# Patient Record
Sex: FEMALE | Race: BLACK OR AFRICAN AMERICAN | URBAN - METROPOLITAN AREA
[De-identification: names, ages, dates, MRNs, and addresses within clinical notes are randomized per-mention and may not be internally consistent; named-entity substitution may affect disease eponyms.]

---

## 2021-05-05 ENCOUNTER — APPOINTMENT (RX ONLY)
Dept: URBAN - METROPOLITAN AREA CLINIC 203 | Facility: CLINIC | Age: 51
Setting detail: DERMATOLOGY
End: 2021-05-05

## 2021-05-05 DIAGNOSIS — L28.0 LICHEN SIMPLEX CHRONICUS: ICD-10-CM | Status: INADEQUATELY CONTROLLED

## 2021-05-05 PROCEDURE — ? PRESCRIPTION

## 2021-05-05 PROCEDURE — ? COUNSELING

## 2021-05-05 PROCEDURE — ? INTRALESIONAL KENALOG

## 2021-05-05 PROCEDURE — ? ADDITIONAL NOTES

## 2021-05-05 PROCEDURE — 11901 INJECT SKIN LESIONS >7: CPT

## 2021-05-05 PROCEDURE — ? PRESCRIPTION MEDICATION MANAGEMENT

## 2021-05-05 RX ORDER — TACROLIMUS 1 MG/G
0.1% OINTMENT TOPICAL BID
Qty: 1 | Refills: 2 | Status: ERX | COMMUNITY
Start: 2021-05-05

## 2021-05-05 RX ADMIN — TACROLIMUS 0.1%: 1 OINTMENT TOPICAL at 00:00

## 2021-05-05 ASSESSMENT — LOCATION DETAILED DESCRIPTION DERM
LOCATION DETAILED: RIGHT DISTAL PRETIBIAL REGION
LOCATION DETAILED: RIGHT LATERAL PROXIMAL PRETIBIAL REGION
LOCATION DETAILED: RIGHT PROXIMAL PRETIBIAL REGION

## 2021-05-05 ASSESSMENT — LOCATION SIMPLE DESCRIPTION DERM: LOCATION SIMPLE: RIGHT PRETIBIAL REGION

## 2021-05-05 ASSESSMENT — LOCATION ZONE DERM: LOCATION ZONE: LEG

## 2021-05-05 NOTE — PROCEDURE: ADDITIONAL NOTES
Additional Notes: Patient consent was obtained to proceed with the visit and recommended plan of care after discussion of all risks and benefits, including the risks of COVID-19 exposure.\\n
Detail Level: Simple
Render Risk Assessment In Note?: no
Additional Notes: Dermajet to farhana ankles (8 each) yanick 4 mg/cc

## 2021-05-05 NOTE — PROCEDURE: INTRALESIONAL KENALOG
Total Volume Injected (Ccs- Only Use Numbers And Decimals): 1
Consent: The risks of atrophy were reviewed with the patient.
X Size Of Lesion In Cm (Optional): 0
Validate Note Data When Using Inventory: Yes
Medical Necessity Clause: This procedure was medically necessary because the lesions that were treated were:
Administered By (Optional): [facility]
Detail Level: Detailed
Include Z78.9 (Other Specified Conditions Influencing Health Status) As An Associated Diagnosis?: No
Concentration Of Solution Injected (Mg/Ml): 4.0
Kenalog Preparation: Kenalog

## 2021-05-05 NOTE — PROCEDURE: PRESCRIPTION MEDICATION MANAGEMENT
none
Detail Level: Zone
Render In Strict Bullet Format?: No
Initiate Treatment: Tacrolimus ointment bid

## 2021-06-02 ENCOUNTER — APPOINTMENT (RX ONLY)
Dept: URBAN - METROPOLITAN AREA CLINIC 203 | Facility: CLINIC | Age: 51
Setting detail: DERMATOLOGY
End: 2021-06-02

## 2021-06-02 DIAGNOSIS — L28.0 LICHEN SIMPLEX CHRONICUS: ICD-10-CM

## 2021-06-02 PROCEDURE — ? ADDITIONAL NOTES

## 2021-06-02 PROCEDURE — ? COUNSELING

## 2021-06-02 PROCEDURE — ? PRESCRIPTION MEDICATION MANAGEMENT

## 2021-06-02 PROCEDURE — 11901 INJECT SKIN LESIONS >7: CPT

## 2021-06-02 PROCEDURE — ? INTRALESIONAL KENALOG

## 2021-06-02 ASSESSMENT — LOCATION DETAILED DESCRIPTION DERM
LOCATION DETAILED: LEFT LATERAL HEEL
LOCATION DETAILED: RIGHT LATERAL DORSAL FOOT
LOCATION DETAILED: RIGHT LATERAL HEEL
LOCATION DETAILED: LEFT LATERAL DORSAL FOOT
LOCATION DETAILED: LEFT LATERAL ACHILLES SKIN

## 2021-06-02 ASSESSMENT — LOCATION SIMPLE DESCRIPTION DERM
LOCATION SIMPLE: RIGHT FOOT
LOCATION SIMPLE: LEFT FOOT

## 2021-06-02 ASSESSMENT — LOCATION ZONE DERM: LOCATION ZONE: FEET

## 2021-06-02 NOTE — PROCEDURE: ADDITIONAL NOTES
Render Risk Assessment In Note?: no
Detail Level: Simple
Additional Notes: Dermajet to farhana heels (8 each) yanick 4 mg/cc
Additional Notes: Patient consent was obtained to proceed with the visit and recommended plan of care after discussion of all risks and benefits, including the risks of COVID-19 exposure.\\n

## 2021-08-04 ENCOUNTER — APPOINTMENT (RX ONLY)
Dept: URBAN - METROPOLITAN AREA CLINIC 203 | Facility: CLINIC | Age: 51
Setting detail: DERMATOLOGY
End: 2021-08-04

## 2021-08-04 DIAGNOSIS — L28.0 LICHEN SIMPLEX CHRONICUS: ICD-10-CM

## 2021-08-04 PROCEDURE — ? TREATMENT REGIMEN

## 2021-08-04 PROCEDURE — ? INTRALESIONAL KENALOG

## 2021-08-04 PROCEDURE — ? COUNSELING

## 2021-08-04 PROCEDURE — ? ADDITIONAL NOTES

## 2021-08-04 PROCEDURE — 11901 INJECT SKIN LESIONS >7: CPT

## 2021-08-04 PROCEDURE — ? PRESCRIPTION MEDICATION MANAGEMENT

## 2021-08-04 ASSESSMENT — LOCATION ZONE DERM
LOCATION ZONE: FEET
LOCATION ZONE: LEG

## 2021-08-04 ASSESSMENT — LOCATION DETAILED DESCRIPTION DERM
LOCATION DETAILED: LEFT ACHILLES SKIN
LOCATION DETAILED: RIGHT HEEL
LOCATION DETAILED: LEFT HEEL
LOCATION DETAILED: RIGHT ACHILLES SKIN

## 2021-08-04 ASSESSMENT — LOCATION SIMPLE DESCRIPTION DERM
LOCATION SIMPLE: RIGHT ACHILLES SKIN
LOCATION SIMPLE: LEFT HEEL
LOCATION SIMPLE: LEFT ACHILLES SKIN
LOCATION SIMPLE: RIGHT HEEL

## 2021-08-04 NOTE — PROCEDURE: ADDITIONAL NOTES
Render Risk Assessment In Note?: no
Detail Level: Simple
Additional Notes: Dermajet to farhana heels (8 each) yanick 10 mg/cc
Additional Notes: Patient consent was obtained to proceed with the visit and recommended plan of care after discussion of all risks and benefits, including the risks of COVID-19 exposure.\\n

## 2021-08-04 NOTE — PROCEDURE: PRESCRIPTION MEDICATION MANAGEMENT
Detail Level: Zone
Plan: Increase ILK to 10mg/ml today
Render In Strict Bullet Format?: No
Continue Regimen: Tacrolimus Ointment BID

## 2021-08-04 NOTE — PROCEDURE: INTRALESIONAL KENALOG
Total Volume Injected (Ccs- Only Use Numbers And Decimals): 1
Consent: The risks of atrophy were reviewed with the patient.
X Size Of Lesion In Cm (Optional): 0
Validate Note Data When Using Inventory: Yes
Medical Necessity Clause: This procedure was medically necessary because the lesions that were treated were:
Administered By (Optional): ALTA Rayo / Sue
Detail Level: Detailed
Include Z78.9 (Other Specified Conditions Influencing Health Status) As An Associated Diagnosis?: No
Concentration Of Solution Injected (Mg/Ml): 10.0
Kenalog Preparation: Kenalog

## 2021-08-18 ENCOUNTER — APPOINTMENT (RX ONLY)
Dept: URBAN - METROPOLITAN AREA CLINIC 203 | Facility: CLINIC | Age: 51
Setting detail: DERMATOLOGY
End: 2021-08-18

## 2021-08-18 DIAGNOSIS — L28.0 LICHEN SIMPLEX CHRONICUS: ICD-10-CM

## 2021-08-18 PROCEDURE — ? COUNSELING

## 2021-08-18 PROCEDURE — 11901 INJECT SKIN LESIONS >7: CPT

## 2021-08-18 PROCEDURE — ? PRESCRIPTION MEDICATION MANAGEMENT

## 2021-08-18 PROCEDURE — ? INTRALESIONAL KENALOG

## 2021-08-18 PROCEDURE — ? ADDITIONAL NOTES

## 2021-08-18 PROCEDURE — ? TREATMENT REGIMEN

## 2021-08-18 ASSESSMENT — LOCATION DETAILED DESCRIPTION DERM
LOCATION DETAILED: LEFT ACHILLES SKIN
LOCATION DETAILED: RIGHT ACHILLES SKIN
LOCATION DETAILED: RIGHT HEEL
LOCATION DETAILED: LEFT HEEL

## 2021-08-18 ASSESSMENT — LOCATION SIMPLE DESCRIPTION DERM
LOCATION SIMPLE: RIGHT HEEL
LOCATION SIMPLE: RIGHT ACHILLES SKIN
LOCATION SIMPLE: LEFT HEEL
LOCATION SIMPLE: LEFT ACHILLES SKIN

## 2021-08-18 ASSESSMENT — LOCATION ZONE DERM
LOCATION ZONE: LEG
LOCATION ZONE: FEET

## 2021-08-18 NOTE — PROCEDURE: PRESCRIPTION MEDICATION MANAGEMENT
Detail Level: Zone
Plan: Repeat DJ/ILK to 10mg/ml today (patient notes improvement from last visit)
Render In Strict Bullet Format?: No
Continue Regimen: Tacrolimus Ointment BID

## 2021-08-18 NOTE — PROCEDURE: INTRALESIONAL KENALOG
Total Volume Injected (Ccs- Only Use Numbers And Decimals): 1
Consent: The risks of atrophy were reviewed with the patient.
X Size Of Lesion In Cm (Optional): 0
Validate Note Data When Using Inventory: Yes
Medical Necessity Clause: This procedure was medically necessary because the lesions that were treated were:
Administered By (Optional): ALTA Mendes / Sue
Detail Level: Detailed
Include Z78.9 (Other Specified Conditions Influencing Health Status) As An Associated Diagnosis?: No
Concentration Of Solution Injected (Mg/Ml): 10.0
Kenalog Preparation: Kenalog

## 2021-09-01 ENCOUNTER — APPOINTMENT (RX ONLY)
Dept: URBAN - METROPOLITAN AREA CLINIC 203 | Facility: CLINIC | Age: 51
Setting detail: DERMATOLOGY
End: 2021-09-01

## 2021-09-01 DIAGNOSIS — L28.0 LICHEN SIMPLEX CHRONICUS: ICD-10-CM | Status: IMPROVED

## 2021-09-01 PROCEDURE — 11901 INJECT SKIN LESIONS >7: CPT

## 2021-09-01 PROCEDURE — ? TREATMENT REGIMEN

## 2021-09-01 PROCEDURE — ? PRESCRIPTION MEDICATION MANAGEMENT

## 2021-09-01 PROCEDURE — ? INTRALESIONAL KENALOG

## 2021-09-01 PROCEDURE — ? ADDITIONAL NOTES

## 2021-09-01 PROCEDURE — ? COUNSELING

## 2021-09-01 ASSESSMENT — LOCATION SIMPLE DESCRIPTION DERM
LOCATION SIMPLE: LEFT ACHILLES SKIN
LOCATION SIMPLE: RIGHT HEEL
LOCATION SIMPLE: LEFT HEEL
LOCATION SIMPLE: RIGHT ACHILLES SKIN

## 2021-09-01 ASSESSMENT — LOCATION DETAILED DESCRIPTION DERM
LOCATION DETAILED: RIGHT ACHILLES SKIN
LOCATION DETAILED: RIGHT HEEL
LOCATION DETAILED: LEFT HEEL
LOCATION DETAILED: LEFT ACHILLES SKIN

## 2021-09-01 ASSESSMENT — LOCATION ZONE DERM
LOCATION ZONE: FEET
LOCATION ZONE: LEG

## 2021-09-01 NOTE — PROCEDURE: ADDITIONAL NOTES
PRE-OP DIAGNOSIS:  Acute appendicitis 18-Aug-2020 17:12:11  Jewel Bass
Additional Notes: Patient consent was obtained to proceed with the visit and recommended plan of care after discussion of all risks and benefits, including the risks of COVID-19 exposure.\\n
Detail Level: Simple
Render Risk Assessment In Note?: no

## 2021-09-15 ENCOUNTER — APPOINTMENT (RX ONLY)
Dept: URBAN - METROPOLITAN AREA CLINIC 203 | Facility: CLINIC | Age: 51
Setting detail: DERMATOLOGY
End: 2021-09-15

## 2021-09-15 DIAGNOSIS — L28.0 LICHEN SIMPLEX CHRONICUS: ICD-10-CM

## 2021-09-15 PROCEDURE — ? ADDITIONAL NOTES

## 2021-09-15 PROCEDURE — ? COUNSELING

## 2021-09-15 PROCEDURE — ? INTRALESIONAL KENALOG

## 2021-09-15 PROCEDURE — ? PRESCRIPTION MEDICATION MANAGEMENT

## 2021-09-15 PROCEDURE — 11901 INJECT SKIN LESIONS >7: CPT

## 2021-09-15 ASSESSMENT — LOCATION SIMPLE DESCRIPTION DERM
LOCATION SIMPLE: RIGHT ANKLE
LOCATION SIMPLE: LEFT HEEL
LOCATION SIMPLE: LEFT ACHILLES SKIN
LOCATION SIMPLE: RIGHT ACHILLES SKIN
LOCATION SIMPLE: RIGHT HEEL

## 2021-09-15 ASSESSMENT — LOCATION DETAILED DESCRIPTION DERM
LOCATION DETAILED: LEFT ACHILLES SKIN
LOCATION DETAILED: RIGHT ACHILLES SKIN
LOCATION DETAILED: LEFT HEEL
LOCATION DETAILED: RIGHT HEEL
LOCATION DETAILED: RIGHT LATERAL POSTERIOR ANKLE

## 2021-09-15 ASSESSMENT — LOCATION ZONE DERM
LOCATION ZONE: LEG
LOCATION ZONE: FEET

## 2021-10-13 ENCOUNTER — APPOINTMENT (RX ONLY)
Dept: URBAN - METROPOLITAN AREA CLINIC 203 | Facility: CLINIC | Age: 51
Setting detail: DERMATOLOGY
End: 2021-10-13

## 2021-10-13 DIAGNOSIS — L28.0 LICHEN SIMPLEX CHRONICUS: ICD-10-CM

## 2021-10-13 PROCEDURE — ? PRESCRIPTION MEDICATION MANAGEMENT

## 2021-10-13 PROCEDURE — ? COUNSELING

## 2021-10-13 PROCEDURE — 11901 INJECT SKIN LESIONS >7: CPT

## 2021-10-13 PROCEDURE — ? INTRALESIONAL KENALOG

## 2021-10-13 PROCEDURE — ? ADDITIONAL NOTES

## 2021-10-13 PROCEDURE — ? PRESCRIPTION

## 2021-10-13 RX ORDER — HYDROCORTISONE 25 MG/G
CREAM TOPICAL
Qty: 30 | Refills: 3 | Status: ERX | COMMUNITY
Start: 2021-10-13

## 2021-10-13 RX ADMIN — HYDROCORTISONE: 25 CREAM TOPICAL at 00:00

## 2021-10-13 ASSESSMENT — LOCATION DETAILED DESCRIPTION DERM
LOCATION DETAILED: LEFT HEEL
LOCATION DETAILED: LEFT ACHILLES SKIN
LOCATION DETAILED: RIGHT LATERAL POSTERIOR ANKLE
LOCATION DETAILED: RIGHT HEEL
LOCATION DETAILED: RIGHT ACHILLES SKIN

## 2021-10-13 ASSESSMENT — LOCATION SIMPLE DESCRIPTION DERM
LOCATION SIMPLE: RIGHT ANKLE
LOCATION SIMPLE: RIGHT ACHILLES SKIN
LOCATION SIMPLE: LEFT HEEL
LOCATION SIMPLE: RIGHT HEEL
LOCATION SIMPLE: LEFT ACHILLES SKIN

## 2021-10-13 ASSESSMENT — LOCATION ZONE DERM
LOCATION ZONE: LEG
LOCATION ZONE: FEET

## 2021-11-17 ENCOUNTER — APPOINTMENT (RX ONLY)
Dept: URBAN - METROPOLITAN AREA CLINIC 203 | Facility: CLINIC | Age: 51
Setting detail: DERMATOLOGY
End: 2021-11-17

## 2021-11-17 DIAGNOSIS — L28.0 LICHEN SIMPLEX CHRONICUS: ICD-10-CM

## 2021-11-17 PROCEDURE — 11901 INJECT SKIN LESIONS >7: CPT

## 2021-11-17 PROCEDURE — ? PRESCRIPTION MEDICATION MANAGEMENT

## 2021-11-17 PROCEDURE — ? PRESCRIPTION

## 2021-11-17 PROCEDURE — ? INTRALESIONAL KENALOG

## 2021-11-17 PROCEDURE — ? COUNSELING

## 2021-11-17 PROCEDURE — ? ADDITIONAL NOTES

## 2021-11-17 RX ORDER — HYDROCORTISONE 25 MG/G
OINTMENT TOPICAL
Qty: 454 | Refills: 0 | Status: ERX | COMMUNITY
Start: 2021-11-17

## 2021-11-17 RX ADMIN — HYDROCORTISONE: 25 OINTMENT TOPICAL at 00:00

## 2021-11-17 ASSESSMENT — LOCATION DETAILED DESCRIPTION DERM
LOCATION DETAILED: LEFT ACHILLES SKIN
LOCATION DETAILED: RIGHT LATERAL POSTERIOR ANKLE
LOCATION DETAILED: LEFT HEEL
LOCATION DETAILED: RIGHT HEEL
LOCATION DETAILED: RIGHT ACHILLES SKIN

## 2021-11-17 ASSESSMENT — LOCATION SIMPLE DESCRIPTION DERM
LOCATION SIMPLE: RIGHT HEEL
LOCATION SIMPLE: RIGHT ANKLE
LOCATION SIMPLE: LEFT HEEL
LOCATION SIMPLE: RIGHT ACHILLES SKIN
LOCATION SIMPLE: LEFT ACHILLES SKIN

## 2021-11-17 ASSESSMENT — LOCATION ZONE DERM
LOCATION ZONE: LEG
LOCATION ZONE: FEET

## 2021-11-17 NOTE — PROCEDURE: PRESCRIPTION MEDICATION MANAGEMENT
Detail Level: Zone
Plan: Repeat DJ/ILK to 10mg/ml today (patient notes improvement from last visit)
Render In Strict Bullet Format?: No
Continue Regimen: Hydrocortisone 2.5% and Tacrolimus Ointment BID

## 2022-06-29 ENCOUNTER — APPOINTMENT (RX ONLY)
Dept: URBAN - METROPOLITAN AREA CLINIC 203 | Facility: CLINIC | Age: 52
Setting detail: DERMATOLOGY
End: 2022-06-29

## 2022-06-29 DIAGNOSIS — L28.0 LICHEN SIMPLEX CHRONICUS: ICD-10-CM

## 2022-06-29 PROCEDURE — ? COUNSELING

## 2022-06-29 PROCEDURE — ? PRESCRIPTION

## 2022-06-29 PROCEDURE — 11901 INJECT SKIN LESIONS >7: CPT

## 2022-06-29 PROCEDURE — ? INTRALESIONAL KENALOG

## 2022-06-29 PROCEDURE — ? PRESCRIPTION MEDICATION MANAGEMENT

## 2022-06-29 RX ORDER — CRISABOROLE 20 MG/G
2% OINTMENT TOPICAL BID
Qty: 100 | Refills: 2 | Status: ERX | COMMUNITY
Start: 2022-06-29

## 2022-06-29 RX ADMIN — CRISABOROLE 2%: 20 OINTMENT TOPICAL at 00:00

## 2022-06-29 ASSESSMENT — LOCATION DETAILED DESCRIPTION DERM
LOCATION DETAILED: LEFT ACHILLES SKIN
LOCATION DETAILED: LEFT HEEL
LOCATION DETAILED: RIGHT ACHILLES SKIN
LOCATION DETAILED: RIGHT PROXIMAL LATERAL CALF
LOCATION DETAILED: RIGHT PROXIMAL CALF
LOCATION DETAILED: RIGHT HEEL

## 2022-06-29 ASSESSMENT — LOCATION SIMPLE DESCRIPTION DERM
LOCATION SIMPLE: LEFT HEEL
LOCATION SIMPLE: RIGHT ACHILLES SKIN
LOCATION SIMPLE: LEFT ACHILLES SKIN
LOCATION SIMPLE: RIGHT HEEL
LOCATION SIMPLE: RIGHT CALF

## 2022-06-29 ASSESSMENT — LOCATION ZONE DERM
LOCATION ZONE: LEG
LOCATION ZONE: FEET

## 2022-06-29 NOTE — PROCEDURE: PRESCRIPTION MEDICATION MANAGEMENT
Detail Level: Zone
Plan: Repeat DJ/ILK to 10mg/ml at follow up
Render In Strict Bullet Format?: No
Initiate Treatment: Eucrisa 2% BID
Discontinue Regimen: Hydrocortisone 2.5% and Tacrolimus Ointment BID

## 2022-06-29 NOTE — PROCEDURE: INTRALESIONAL KENALOG
Include Z78.9 (Other Specified Conditions Influencing Health Status) As An Associated Diagnosis?: No
X Size Of Lesion In Cm (Optional): 0
Medical Necessity Clause: This procedure was medically necessary because the lesions that were treated were:
Detail Level: Detailed
Validate Note Data When Using Inventory: Yes
Consent: The risks of atrophy were reviewed with the patient.
Kenalog Preparation: Kenalog
Administered By (Optional): Bertram Grijalva
Concentration Of Solution Injected (Mg/Ml): 10.0
Total Volume Injected (Ccs- Only Use Numbers And Decimals): 1.5

## 2022-07-29 ENCOUNTER — APPOINTMENT (RX ONLY)
Dept: URBAN - METROPOLITAN AREA CLINIC 203 | Facility: CLINIC | Age: 52
Setting detail: DERMATOLOGY
End: 2022-07-29

## 2022-07-29 DIAGNOSIS — L29.8 OTHER PRURITUS: ICD-10-CM

## 2022-07-29 DIAGNOSIS — L20.89 OTHER ATOPIC DERMATITIS: ICD-10-CM | Status: INADEQUATELY CONTROLLED

## 2022-07-29 DIAGNOSIS — L29.89 OTHER PRURITUS: ICD-10-CM

## 2022-07-29 PROCEDURE — 99214 OFFICE O/P EST MOD 30 MIN: CPT | Mod: 25

## 2022-07-29 PROCEDURE — ? MEDICATION COUNSELING

## 2022-07-29 PROCEDURE — 96372 THER/PROPH/DIAG INJ SC/IM: CPT

## 2022-07-29 PROCEDURE — ? PRESCRIPTION MEDICATION MANAGEMENT

## 2022-07-29 PROCEDURE — ? COUNSELING

## 2022-07-29 PROCEDURE — ? PRESCRIPTION

## 2022-07-29 PROCEDURE — ? INTRAMUSCULAR KENALOG

## 2022-07-29 RX ORDER — RUXOLITINIB 15 MG/G
1.5% CREAM TOPICAL BID
Qty: 60 | Refills: 3 | Status: ERX | COMMUNITY
Start: 2022-07-29

## 2022-07-29 RX ADMIN — RUXOLITINIB 1.5%: 15 CREAM TOPICAL at 00:00

## 2022-07-29 ASSESSMENT — LOCATION SIMPLE DESCRIPTION DERM
LOCATION SIMPLE: RIGHT BUTTOCK
LOCATION SIMPLE: RIGHT PRETIBIAL REGION
LOCATION SIMPLE: RIGHT THIGH
LOCATION SIMPLE: RIGHT BUTTOCK
LOCATION SIMPLE: RIGHT HAND

## 2022-07-29 ASSESSMENT — SEVERITY ASSESSMENT 2020: SEVERITY 2020: MODERATE

## 2022-07-29 ASSESSMENT — BSA ECZEMA: % BODY COVERED IN ECZEMA: 15

## 2022-07-29 ASSESSMENT — LOCATION ZONE DERM
LOCATION ZONE: TRUNK
LOCATION ZONE: LEG
LOCATION ZONE: HAND
LOCATION ZONE: TRUNK

## 2022-07-29 ASSESSMENT — LOCATION DETAILED DESCRIPTION DERM
LOCATION DETAILED: RIGHT BUTTOCK
LOCATION DETAILED: RIGHT ULNAR PALM
LOCATION DETAILED: RIGHT PROXIMAL PRETIBIAL REGION
LOCATION DETAILED: RIGHT ANTERIOR DISTAL THIGH
LOCATION DETAILED: RIGHT BUTTOCK

## 2022-07-29 ASSESSMENT — ITCH NUMERIC RATING SCALE: HOW SEVERE IS YOUR ITCHING?: 7

## 2022-07-29 NOTE — PROCEDURE: PRESCRIPTION MEDICATION MANAGEMENT
Initiate Treatment: Opzelura 1.5 % topical cream BID\\nQuantity: 60.0 g  Days Supply: 30\\nSig: Apply to affected areas BID X 2 weeks to hands
Detail Level: Zone
Render In Strict Bullet Format?: No

## 2022-07-29 NOTE — PROCEDURE: MEDICATION COUNSELING
Negative Gabapentin Counseling: I discussed with the patient the risks of gabapentin including but not limited to dizziness, somnolence, fatigue and ataxia.

## 2022-07-29 NOTE — PROCEDURE: MEDICATION COUNSELING
Never smoker Cibinqo Pregnancy And Lactation Text: It is unknown if this medication will adversely affect pregnancy or breast feeding.  You should not take this medication if you are currently pregnant or planning a pregnancy or while breastfeeding.

## 2022-08-03 ENCOUNTER — APPOINTMENT (RX ONLY)
Dept: URBAN - METROPOLITAN AREA CLINIC 203 | Facility: CLINIC | Age: 52
Setting detail: DERMATOLOGY
End: 2022-08-03

## 2022-08-03 DIAGNOSIS — L20.89 OTHER ATOPIC DERMATITIS: ICD-10-CM

## 2022-08-03 PROCEDURE — ? PRESCRIPTION

## 2022-08-03 PROCEDURE — ? COUNSELING

## 2022-08-03 PROCEDURE — 99214 OFFICE O/P EST MOD 30 MIN: CPT

## 2022-08-03 PROCEDURE — ? PRESCRIPTION MEDICATION MANAGEMENT

## 2022-08-03 RX ORDER — PREDNISONE 10 MG/1
TABLET ORAL
Qty: 40 | Refills: 0 | Status: ERX | COMMUNITY
Start: 2022-08-03

## 2022-08-03 RX ADMIN — PREDNISONE: 10 TABLET ORAL at 00:00

## 2022-08-03 ASSESSMENT — LOCATION DETAILED DESCRIPTION DERM
LOCATION DETAILED: RIGHT ANTERIOR DISTAL THIGH
LOCATION DETAILED: RIGHT ULNAR PALM
LOCATION DETAILED: RIGHT PROXIMAL PRETIBIAL REGION

## 2022-08-03 ASSESSMENT — LOCATION SIMPLE DESCRIPTION DERM
LOCATION SIMPLE: RIGHT PRETIBIAL REGION
LOCATION SIMPLE: RIGHT HAND
LOCATION SIMPLE: RIGHT THIGH

## 2022-08-03 ASSESSMENT — LOCATION ZONE DERM
LOCATION ZONE: LEG
LOCATION ZONE: HAND

## 2022-08-03 NOTE — PROCEDURE: PRESCRIPTION MEDICATION MANAGEMENT
Plan: Continue using Eucerin topical lotion\\nContinue using Claritin and Benadryl for flares\\nRecommended taking Zyrtec right before bedtime
Render In Strict Bullet Format?: No
Detail Level: Zone
Initiate Treatment: prednisone 10 mg tablet \\nQuantity: 40.0 Tablet  Days Supply: 16\\nSig: Take 4 pills a day for 4 days, 3 pills a day for 4 days, 2 pills a day for 4 days, then 1 pill a day for 4 days.

## 2022-08-09 ENCOUNTER — RX ONLY (OUTPATIENT)
Age: 52
Setting detail: RX ONLY
End: 2022-08-09

## 2022-08-09 RX ORDER — PIMECROLIMUS 10 MG/G
CREAM TOPICAL
Qty: 60 | Refills: 0 | Status: ERX | COMMUNITY
Start: 2022-08-09

## 2022-08-24 ENCOUNTER — APPOINTMENT (RX ONLY)
Dept: URBAN - METROPOLITAN AREA CLINIC 203 | Facility: CLINIC | Age: 52
Setting detail: DERMATOLOGY
End: 2022-08-24

## 2022-08-24 DIAGNOSIS — L20.89 OTHER ATOPIC DERMATITIS: ICD-10-CM

## 2022-08-24 PROBLEM — L30.9 DERMATITIS, UNSPECIFIED: Status: ACTIVE | Noted: 2022-08-24

## 2022-08-24 PROCEDURE — ? BIOPSY BY PUNCH METHOD

## 2022-08-24 PROCEDURE — ? COUNSELING

## 2022-08-24 PROCEDURE — 11104 PUNCH BX SKIN SINGLE LESION: CPT

## 2022-08-24 PROCEDURE — ? PRESCRIPTION MEDICATION MANAGEMENT

## 2022-08-24 ASSESSMENT — LOCATION ZONE DERM: LOCATION ZONE: TRUNK

## 2022-08-24 ASSESSMENT — LOCATION SIMPLE DESCRIPTION DERM: LOCATION SIMPLE: ABDOMEN

## 2022-08-24 ASSESSMENT — LOCATION DETAILED DESCRIPTION DERM: LOCATION DETAILED: SUBXIPHOID

## 2022-08-24 NOTE — PROCEDURE: PRESCRIPTION MEDICATION MANAGEMENT
Render In Strict Bullet Format?: No
Detail Level: Zone
Samples Given: Sarna itch lotion
Discontinue Regimen: Prednisone 10mg tablet

## 2022-08-24 NOTE — PROCEDURE: BIOPSY BY PUNCH METHOD
Detail Level: Detailed
Was A Bandage Applied: Yes
Punch Size In Mm: 3
Biopsy Type: H and E
Anesthesia Type: 1% lidocaine with epinephrine
Anesthesia Volume In Cc (Will Not Render If 0): 0.5
Additional Anesthesia Volume In Cc (Will Not Render If 0): 0
Hemostasis: TCA 35%
Epidermal Sutures: none, closed by secondary intention
Wound Care: Petrolatum
Dressing: pressure dressing
Patient Will Remove Sutures At Home?: No
Lab: 6
Consent: Written consent was obtained and risks were reviewed including but not limited to scarring, infection, bleeding, scabbing, incomplete removal, nerve damage and allergy to anesthesia.
Post-Care Instructions: I reviewed with the patient in detail post-care instructions. Patient is to keep the biopsy site dry overnight, and then apply bacitracin twice daily until healed. Patient may apply hydrogen peroxide soaks to remove any crusting.
Home Suture Removal Text: Patient was provided a home suture removal kit and will remove their sutures at home.  If they have any questions or difficulties they will call the office.
Notification Instructions: Patient will be notified of biopsy results. However, patient instructed to call the office if not contacted within 2 weeks.
Billing Type: Third-Party Bill
Information: Selecting Yes will display possible errors in your note based on the variables you have selected. This validation is only offered as a suggestion for you. PLEASE NOTE THAT THE VALIDATION TEXT WILL BE REMOVED WHEN YOU FINALIZE YOUR NOTE. IF YOU WANT TO FAX A PRELIMINARY NOTE YOU WILL NEED TO TOGGLE THIS TO 'NO' IF YOU DO NOT WANT IT IN YOUR FAXED NOTE.

## 2022-09-07 ENCOUNTER — RX ONLY (OUTPATIENT)
Age: 52
Setting detail: RX ONLY
End: 2022-09-07

## 2022-09-07 RX ORDER — FLUCONAZOLE 150 MG/1
TABLET ORAL
Qty: 4 | Refills: 0 | Status: ERX | COMMUNITY
Start: 2022-09-07

## 2022-09-07 RX ORDER — CICLOPIROX OLAMINE 7.7 MG/G
CREAM TOPICAL
Qty: 90 | Refills: 0 | Status: ERX | COMMUNITY
Start: 2022-09-07

## 2022-09-07 RX ORDER — KETOCONAZOLE 20 MG/ML
SHAMPOO, SUSPENSION TOPICAL
Qty: 120 | Refills: 0 | Status: ERX | COMMUNITY
Start: 2022-09-07

## 2022-12-19 ENCOUNTER — RX ONLY (OUTPATIENT)
Age: 52
Setting detail: RX ONLY
End: 2022-12-19

## 2022-12-19 RX ORDER — CICLOPIROX OLAMINE 7.7 MG/G
CREAM TOPICAL
Qty: 90 | Refills: 2 | Status: ERX